# Patient Record
(demographics unavailable — no encounter records)

---

## 2025-03-18 NOTE — HISTORY OF PRESENT ILLNESS
[de-identified] : cough x  1 day, fever, stomach aches, s/t   per mom pt has bam tubes - unsure if still in place and had tonsils and adenoids removed [FreeTextEntry6] : Sore throat x 1 day Stomach ache this morning, vomiting x 1 Fever x 1 day Cough and runny nose  No ear pain No wheezing or dyspnea Normal appetite, No diarrhea Tired and body aches all day No recent sick contacts No recent Covid contacts or exposure No recent travel or contact with travelers

## 2025-03-18 NOTE — DISCUSSION/SUMMARY
[FreeTextEntry1] : Start medication as prescribed Symptomatic treatment of fever and/or pain discussed Stat strep test ordered Throat culture, if POSITIVE, continue meds Covid test NOT done, deferred by parent Hydrate well Handwashing and infection control discussed Return to office if febrile > 48 hours or if symptoms get worse Go to ER if unable to come to the office or during after hours, parent encouraged to call service first before doing so. Recheck ears 2-3 weeks

## 2025-03-18 NOTE — REVIEW OF SYSTEMS
[Fever] : fever [Malaise] : malaise [Ear Pain] : no ear pain [Nasal Discharge] : nasal discharge [Nasal Congestion] : nasal congestion [Sore Throat] : sore throat [Cyanosis] : no cyanosis [Tachypnea] : not tachypneic [Wheezing] : no wheezing [Cough] : cough [Vomiting] : no vomiting [Diarrhea] : no diarrhea [Abdominal Pain] : abdominal pain [Negative] : Genitourinary

## 2025-03-18 NOTE — PHYSICAL EXAM
[Conjuctival Injection] : no conjunctival injection [Discharge] : no discharge [Erythema] : erythema [Bulging] : bulging [Clear Rhinorrhea] : clear rhinorrhea [Erythematous Oropharynx] : erythematous oropharynx [Vesicles] : no vesicles [Exudate] : no exudate [Ulcerative Lesions] : no ulcerative lesions [Palate petechiae] : palate without petechiae [Wheezing] : no wheezing [Rales] : no rales [Crackles] : no crackles [Transmitted Upper Airway Sounds] : no transmitted upper airway sounds [Tachypnea] : no tachypnea [Rhonchi] : no rhonchi [Belly Breathing] : no belly breathing [Tenderness with Palpation] : no tenderness with palpation [Mass] : no mass palpable [McBurney's point tenderness] : no McBurney's point tenderness [Enlarged] : enlarged [Submandibular] : submandibular [NL] : warm, clear